# Patient Record
Sex: MALE | Race: BLACK OR AFRICAN AMERICAN
[De-identification: names, ages, dates, MRNs, and addresses within clinical notes are randomized per-mention and may not be internally consistent; named-entity substitution may affect disease eponyms.]

---

## 2018-12-21 ENCOUNTER — HOSPITAL ENCOUNTER (EMERGENCY)
Dept: HOSPITAL 25 - ED | Age: 43
Discharge: HOME | End: 2018-12-21
Payer: MEDICAID

## 2018-12-21 VITALS — DIASTOLIC BLOOD PRESSURE: 92 MMHG | SYSTOLIC BLOOD PRESSURE: 147 MMHG

## 2018-12-21 DIAGNOSIS — L02.512: Primary | ICD-10-CM

## 2018-12-21 LAB
ANION GAP SERPL CALC-SCNC: 3 MMOL/L (ref 2–11)
BASOPHILS # BLD AUTO: 0 10^3/UL (ref 0–0.2)
BUN SERPL-MCNC: 6 MG/DL (ref 6–24)
BUN/CREAT SERPL: 6.5 (ref 8–20)
CALCIUM SERPL-MCNC: 8.8 MG/DL (ref 8.6–10.3)
CHLORIDE SERPL-SCNC: 111 MMOL/L (ref 101–111)
EOSINOPHIL # BLD AUTO: 0.1 10^3/UL (ref 0–0.6)
GLUCOSE SERPL-MCNC: 77 MG/DL (ref 70–100)
HCO3 SERPL-SCNC: 26 MMOL/L (ref 22–32)
HCT VFR BLD AUTO: 36 % (ref 42–52)
HGB BLD-MCNC: 11.4 G/DL (ref 14–18)
LYMPHOCYTES # BLD AUTO: 2.3 10^3/UL (ref 1–4.8)
MCH RBC QN AUTO: 26 PG (ref 27–31)
MCHC RBC AUTO-ENTMCNC: 32 G/DL (ref 31–36)
MCV RBC AUTO: 79 FL (ref 80–94)
MONOCYTES # BLD AUTO: 0.5 10^3/UL (ref 0–0.8)
NEUTROPHILS # BLD AUTO: 2.5 10^3/UL (ref 1.5–7.7)
NRBC # BLD AUTO: 0 10^3/UL
NRBC BLD QL AUTO: 0.1
PLATELET # BLD AUTO: 178 10^3/UL (ref 150–450)
POTASSIUM SERPL-SCNC: 3.9 MMOL/L (ref 3.5–5)
RBC # BLD AUTO: 4.49 10^6/UL (ref 4–5.4)
SODIUM SERPL-SCNC: 140 MMOL/L (ref 135–145)
WBC # BLD AUTO: 5.5 10^3/UL (ref 3.5–10.8)

## 2018-12-21 PROCEDURE — 80048 BASIC METABOLIC PNL TOTAL CA: CPT

## 2018-12-21 PROCEDURE — 86140 C-REACTIVE PROTEIN: CPT

## 2018-12-21 PROCEDURE — 36415 COLL VENOUS BLD VENIPUNCTURE: CPT

## 2018-12-21 PROCEDURE — 99282 EMERGENCY DEPT VISIT SF MDM: CPT

## 2018-12-21 PROCEDURE — 96374 THER/PROPH/DIAG INJ IV PUSH: CPT

## 2018-12-21 PROCEDURE — 10060 I&D ABSCESS SIMPLE/SINGLE: CPT

## 2018-12-21 PROCEDURE — 85025 COMPLETE CBC W/AUTO DIFF WBC: CPT

## 2018-12-21 RX ADMIN — CLINDAMYCIN HYDROCHLORIDE ONE MG: 150 CAPSULE ORAL at 13:58

## 2018-12-21 RX ADMIN — LIDOCAINE HYDROCHLORIDE ONE: 10 INJECTION, SOLUTION EPIDURAL; INFILTRATION; INTRACAUDAL; PERINEURAL at 13:48

## 2018-12-21 RX ADMIN — CLINDAMYCIN IN 5 PERCENT DEXTROSE ONE: 6 INJECTION, SOLUTION INTRAVENOUS at 13:48

## 2018-12-21 NOTE — ED
Upper Extremity Pain





- HPI Summary


HPI Summary: 


Patient is a 43-year-old male who presents emergency department for infection 

to his left hand times several days.  Patient states he had a callus to the 

side of his left hand that he picked open which led to secondary infection.  

Patient states she was able to squeeze purulent matter from the area a few days 

ago.  He denies fever, chills, nausea, vomiting.  He denies past medical 

history other than psychiatric disorder.  Denies IV drug use.  Patient states 

he is currently visiting the area.  Touching affected area makes symptoms 

worse.  Nothing makes symptoms better.  Symptoms are moderate in severity.








- History of Current Complaint


Chief Complaint: EDExtremityUpper


Stated Complaint: POSS INF IN HAND


Time Seen by Provider: 12/21/18 11:58


Hx Obtained From: Patient





- Allergies/Home Medications


Allergies/Adverse Reactions: 


 Allergies











Allergy/AdvReac Type Severity Reaction Status Date / Time


 


No Known Allergies Allergy   Verified 12/21/18 11:36














PMH/Surg Hx/FS Hx/Imm Hx


Previously Healthy: Yes


Infectious Disease History: No


Infectious Disease History: 


   Denies: Traveled Outside the [place] in Last 30 Days





- Family History


Known Family History: Positive: Non-Contributory





- Social History


Occupation: Unemployed


Lives: With Family





Review of Systems


Constitutional: Negative


Negative: Fever, Chills


Positive: Other - Pain and swelling to lateral left hand


Neurological: Negative


Negative: Weakness, Paresthesia, Numbness


All Other Systems Reviewed And Are Negative: Yes





Physical Exam


Triage Information Reviewed: Yes


Vital Signs On Initial Exam: 


 Initial Vitals











Temp Pulse Resp BP Pulse Ox


 


 98.2 F   76   18   147/92   98 


 


 12/21/18 11:33  12/21/18 11:33  12/21/18 11:33  12/21/18 11:33  12/21/18 11:33











Vital Signs Reviewed: Yes


Appearance: Positive: Well-Appearing - Pt. sitting on bed in NAD. Family member 

present.


Skin: Positive: Warm, Dry


Head/Face: Positive: Normal Head/Face Inspection


Eyes: Positive: Normal, EOMI


Neck: Positive: Supple


Musculoskeletal: Positive: Other - NOted to the lateral aspect of the left hand 

there is a roughly 4cm area of fluctuance with a calus in the center and 

overlying erythema. Edema does not extend to fingers or wrist. Full ROM of 

wrist and finger.


Neurological: Positive: Normal, CN Intact II-III





Procedures





- Incision and Drainage


  ** Left Hand


Site: Lateral left hand


Anesthesia: Local - 1% lidocain 1 cc


Instrument(s): Scalpel


Packing: Other - nonpacking. Small amount of blood expressed. pt. tolerated 

well.





Diagnostics





- Vital Signs


 Vital Signs











  Temp Pulse Resp BP Pulse Ox


 


 12/21/18 11:33  98.2 F  76  18  147/92  98














- Laboratory


Result Diagrams: 


 12/21/18 12:32





 12/21/18 12:32


Lab Statement: Any lab studies that have been ordered have been reviewed, and 

results considered in the medical decision making process.





Course/Dx





- Course


Course Of Treatment: Patient presenting with likely abscess to his left lateral 

hand.  He is afebrile and well-appearing.  He has no extension to the digits or 

wrist.  X-rays and basic labs were obtained.  Normal WBC.  No every elevation 

in CRP. Reading per radiology: IMPRESSION: MEDIAL SOFT TISSUE SWELLING AND AN 

AREA OF DECREASED DENSITY POSSIBLY.  REPRESENTING GAS OR A SOFT TISSUE DEFECT. 

I spoke with oncall orthopedics, Dr. Alanis, and reviewed case and xray. Dr. Alanis recommends I and D if possible, antibx and they can see him in office 

Monday. I attempted to I and D area of concern without results of purulent 

drainage. Pt. started on clindamycin. Advised to elevate and apply warm 

compresses. Tylenol or Motrin fo rpain as directed. To call ortho today to 

schedule monday apt. To return to ER over the weekend for increased swelling, 

redness, pain, fever, or if concerned. Pt. understands and agree with plan.





- Diagnoses


Differential Diagnosis/HQI/PQRI: Positive: Bursitis, Contusion, Fracture (Closed

), Septic Arthritis, Strain, Sprain


Provider Diagnoses: 


 Hand abscess








Discharge





- Sign-Out/Discharge


Documenting (check all that apply): Patient Departure





- Discharge Plan


Condition: Good


Disposition: HOME


Prescriptions: 


Clindamycin HCl 300 mg PO QID #40 capsule


Patient Education Materials:  Abscess (ED)


Referrals: 


Manuel Carrillo MD [Medical Doctor] - 


Additional Instructions: 


Call Dr. Alanis's off today to schedule an appointment for Monday


Take antibiotics as directed


Apply heat at least 3x a day


Tylenol or Motrin for pain as directed


Return to ER for increased pain, swelling, redness, fever, difficulty moving 

wrist of fingers of if concerned





- Billing Disposition and Condition


Condition: GOOD


Disposition: Home